# Patient Record
Sex: MALE | Race: BLACK OR AFRICAN AMERICAN | Employment: FULL TIME | URBAN - METROPOLITAN AREA
[De-identification: names, ages, dates, MRNs, and addresses within clinical notes are randomized per-mention and may not be internally consistent; named-entity substitution may affect disease eponyms.]

---

## 2022-03-19 PROBLEM — M25.562 CHRONIC PAIN OF BOTH KNEES: Status: ACTIVE | Noted: 2019-07-23

## 2022-03-19 PROBLEM — M54.50 CHRONIC RIGHT-SIDED LOW BACK PAIN WITHOUT SCIATICA: Status: ACTIVE | Noted: 2019-07-23

## 2022-03-19 PROBLEM — G89.29 CHRONIC PAIN OF BOTH KNEES: Status: ACTIVE | Noted: 2019-07-23

## 2022-03-19 PROBLEM — E78.2 MIXED HYPERLIPIDEMIA: Status: ACTIVE | Noted: 2019-07-31

## 2022-03-19 PROBLEM — E66.01 OBESITY, MORBID (HCC): Status: ACTIVE | Noted: 2019-07-23

## 2022-03-19 PROBLEM — M25.561 CHRONIC PAIN OF BOTH KNEES: Status: ACTIVE | Noted: 2019-07-23

## 2022-03-19 PROBLEM — G89.29 CHRONIC RIGHT-SIDED LOW BACK PAIN WITHOUT SCIATICA: Status: ACTIVE | Noted: 2019-07-23

## 2023-07-10 ENCOUNTER — OFFICE VISIT (OUTPATIENT)
Dept: ORTHOPEDIC SURGERY | Age: 28
End: 2023-07-10

## 2023-07-10 VITALS — BODY MASS INDEX: 44.09 KG/M2 | WEIGHT: 308 LBS | HEIGHT: 70 IN

## 2023-07-10 DIAGNOSIS — M19.072 PRIMARY OSTEOARTHRITIS OF LEFT ANKLE: ICD-10-CM

## 2023-07-10 DIAGNOSIS — S93.402A SPRAIN OF LEFT ANKLE, UNSPECIFIED LIGAMENT, INITIAL ENCOUNTER: Primary | ICD-10-CM

## 2023-07-10 RX ORDER — IBUPROFEN 400 MG/1
400 TABLET ORAL EVERY 6 HOURS PRN
COMMUNITY

## 2023-07-25 ENCOUNTER — OFFICE VISIT (OUTPATIENT)
Dept: ORTHOPEDIC SURGERY | Age: 28
End: 2023-07-25

## 2023-07-25 DIAGNOSIS — S93.402A SPRAIN OF LEFT ANKLE, UNSPECIFIED LIGAMENT, INITIAL ENCOUNTER: Primary | ICD-10-CM

## 2023-07-25 NOTE — PROGRESS NOTES
The patient was prescribed a Wraptor brace for the patient's leftfoot. The patient wears a size 12 shoe and I fitted the patient with a XXL brace. I explained how to fit the brace properly by pulling the lace tabs across top of foot first then under arch and lastly pulling the strap up firmly and attaching to the lateral Velcro strip. Thus forming a figure 8 across the ankle joint. Once the figure 8 is completed they are to secure the top (short circumferential) straps to help avoid the straps from loosening with normal wear. The patient was able to demonstrate proper fitting in office to ensure compliance with device and acknowledged satisfaction with current fit. Patient read and signed documenting they understand and agree to [facility]'s current DME return policy.
neutral and forefoot supinated there is good ankle dorsiflexion with the knee flexed and extended. Neutral hindfoot alignment. No cavovarus nor planovalgus foot deformity  Talar tilt exam : slightly abnormal with mild laxity  Anterior drawer exam w/ ankle plantarflexed at 20 deg: mild translation    Neuro:  normal SILT to s/s/sp/dp/t. Reflexes normal: 1+ patella reflex bilaterally, 1+ achilles reflex bilaterally, negative babinski bilaterally. no signs of hyper reflexia or absent reflex    Vascular: Bilateral DP and PT: dorsalis pedis 4/4    Imaging:   I independently interpreted an MRI ordered by a different physician, taken from an outside facility of the and   MRI from July 2023 of the left ankle  Findings: Peroneal tendons posterior tibial tendon and Achilles tendon anterior tibial tendon are normal.  No signs of tendinous damage. Significant findings indicate a ATFL tear with potential CFL attenuation. There is small ankle effusion with small amount of subtalar joint effusion. The anterior process the calcaneus and the sinus Tarsi region does have some fluid within the bone indicating bony contusion. X-rays of the left ankle from 7/10/2023  X-Ray LEFT Ankle 3 vw (AP/Lateral/Oblique) for ankle pain   Findings: No signs of displacement of the mortise. No signs of acute injury or fracture to the talus, mortise, fibula, or distal tibia. No signs of chronic damage, neoplastic process or infection   Impression:  Stable ankle    Signature: Yudith Rizzo MD        Differential Diagnosis:     Left lateral ankle sprain with bone contusion     Treatment Plan:   I had a thorough discussion with the patient regarding the Treatment Plan    4 This is a chronic illness/condition with exacerbation and progression  Treatment at this time:  ASO brace and Prescription Drug Management    Weight-bearing status: WBAT in Boot/hardsole shoe        Return to work/work restrictions: none  Mobic 15mg p.o. qday x 14 days:  An
goal directed

## 2023-07-26 DIAGNOSIS — S93.402A SPRAIN OF LEFT ANKLE, UNSPECIFIED LIGAMENT, INITIAL ENCOUNTER: ICD-10-CM

## 2023-08-01 ENCOUNTER — TELEPHONE (OUTPATIENT)
Dept: ORTHOPEDIC SURGERY | Age: 28
End: 2023-08-01

## 2023-08-01 NOTE — TELEPHONE ENCOUNTER
He saw Dr. Magi Farley last week and he was going to call in some RX, a steroid and anti-inflammatory he thinks. His pharmacy has not received. Can you let him know the status please.

## 2023-09-07 ENCOUNTER — OFFICE VISIT (OUTPATIENT)
Dept: ORTHOPEDIC SURGERY | Age: 28
End: 2023-09-07

## 2023-09-07 DIAGNOSIS — S93.402A SPRAIN OF LEFT ANKLE, UNSPECIFIED LIGAMENT, INITIAL ENCOUNTER: Primary | ICD-10-CM

## 2023-09-07 NOTE — PROGRESS NOTES
Patient was fitted and instructed on a Reparel Ankle Sleeve for the left ankle. Patient read and signed documenting they understand and agree to [facility]'s current DME return policy.
groove/anterior calcaneus region  normal silverskoid exam: With the hindfoot in neutral and forefoot supinated there is good ankle dorsiflexion with the knee flexed and extended. Neutral hindfoot alignment. No cavovarus nor planovalgus foot deformity  Talar tilt exam : slightly abnormal with mild laxity  Anterior drawer exam w/ ankle plantarflexed at 20 deg: mild translation    Neuro:  normal SILT to s/s/sp/dp/t. Reflexes normal: 1+ patella reflex bilaterally, 1+ achilles reflex bilaterally, negative babinski bilaterally. no signs of hyper reflexia or absent reflex    Vascular: Bilateral DP and PT: dorsalis pedis 4/4    Imaging:   I independently interpreted an MRI ordered by a different physician, taken from an outside facility of the and   MRI from July 2023 of the left ankle  Findings: Peroneal tendons posterior tibial tendon and Achilles tendon anterior tibial tendon are normal.  No signs of tendinous damage. Significant findings indicate a ATFL tear with potential CFL attenuation. There is small ankle effusion with small amount of subtalar joint effusion. The anterior process the calcaneus and the sinus Tarsi region does have some fluid within the bone indicating bony contusion. X-rays of the left ankle from 7/10/2023  X-Ray LEFT Ankle 3 vw (AP/Lateral/Oblique) for ankle pain   Findings: No signs of displacement of the mortise. No signs of acute injury or fracture to the talus, mortise, fibula, or distal tibia.  No signs of chronic damage, neoplastic process or infection   Impression:  Stable ankle    Signature: Arley Medina MD        Differential Diagnosis:     Left lateral ankle sprain with bone contusion     Treatment Plan:   I had a thorough discussion with the patient regarding the Treatment Plan    Doing much better  RTW no restriction  Ankle compression sleeve    F/u prn         Past Medical History:   Diagnosis Date    Arthritis     Asthma     nebulizer-last used couple years ago/no